# Patient Record
Sex: MALE
[De-identification: names, ages, dates, MRNs, and addresses within clinical notes are randomized per-mention and may not be internally consistent; named-entity substitution may affect disease eponyms.]

---

## 2018-07-03 ENCOUNTER — HOSPITAL ENCOUNTER (EMERGENCY)
Dept: HOSPITAL 31 - C.ER | Age: 23
Discharge: HOME | End: 2018-07-03
Payer: COMMERCIAL

## 2018-07-03 VITALS
RESPIRATION RATE: 16 BRPM | TEMPERATURE: 97.6 F | OXYGEN SATURATION: 98 % | DIASTOLIC BLOOD PRESSURE: 72 MMHG | HEART RATE: 90 BPM | SYSTOLIC BLOOD PRESSURE: 119 MMHG

## 2018-07-03 DIAGNOSIS — H61.23: Primary | ICD-10-CM

## 2018-07-03 NOTE — C.PDOC
History Of Present Illness


22 year old male presents to the ED for removal of ear wax from his bilateral 

ears. Patient was seen by his PMD after feeling clogged sensation in his ears 

and told that he has ear wax in his bilateral ears. Patient cannot afford to 

see a doctor for wax removal and presents to the ED for further evaluation. He 

denies fever, chills, or ear pain. 


Time Seen by Provider: 07/03/18 13:06


Chief Complaint (Nursing): ENT Problem


History Per: Patient


History/Exam Limitations: None


Onset/Duration Of Symptoms: Days


Current Symptoms Are (Timing): Still Present


Quality (Ear): denies: Pain W/Touch





Past Medical History


Reviewed: Historical Data, Nursing Documentation, Vital Signs


Vital Signs: 


 Last Vital Signs











Temp  97.6 F   07/03/18 12:59


 


Pulse  90   07/03/18 12:59


 


Resp  16   07/03/18 12:59


 


BP  119/72   07/03/18 12:59


 


Pulse Ox  98   07/03/18 16:42














- Medical History


PMH: No Chronic Diseases


Surgical History: No Surg Hx


Family History: States: Unknown Family Hx





- Social History


Hx Alcohol Use: No


Hx Substance Use: No





- Immunization History


Hx Tetanus Toxoid Vaccination: Yes


Hx Influenza Vaccination: No


Hx Pneumococcal Vaccination: No





Review Of Systems


Constitutional: Negative for: Fever, Chills


ENT: Positive for: Other (cerumen in bilateral ears ).  Negative for: Ear Pain





Physical Exam





- Physical Exam


Appears: Non-toxic, No Acute Distress


Skin: Normal Color, Warm, Dry


Head: Atraumatic, Normacephalic


Eye(s): bilateral: Normal Inspection


Ear(s): Bilateral: TM Obscured By Wax (dark and hard cerumen visualized )


Oral Mucosa: Moist


Extremity: Normal ROM


Neurological/Psych: Oriented x3, Normal Speech, Normal Cognition





ED Course And Treatment


O2 Sat by Pulse Oximetry: 98 (on RA)


Pulse Ox Interpretation: Normal


Progress Note: Patient is resting comfortably, showing no signs of distress and 

is stable for discharge. Patient is advised to apply Debrox ear drops to both 

of his ears for three days and then return to the ED for cerumen removal.





Disposition





- Disposition


Referrals: 


Behin,Babak, MD [Staff Provider] - 


Disposition: HOME/ ROUTINE


Disposition Time: 13:09


Condition: STABLE


Additional Instructions: 


Follow up with PMD / ENT within 1-2 days. Return to ED if feel worse.


Prescriptions: 


Carbamide Peroxide [Debrox Ear Drops] 4 drop AU TID #1 bottle


Instructions:  Ear Wax Impaction (DC)


Forms:  [employer] (English)


Print Language: Thai





- Clinical Impression


Clinical Impression: 


 Impacted cerumen of both ears








- PA / NP / Resident Statement


MD/DO has reviewed & agrees with the documentation as recorded.





- Scribe Statement


The provider has reviewed the documentation as recorded by the Scribe (Elaine Long)








All medical record entries made by the Scribe were at my direction and 

personally dictated by me. I have reviewed the chart and agree that the record 

accurately reflects my personal performance of the history, physical exam, 

medical decision making, and the department course for this patient. I have 

also personally directed, reviewed, and agree with the discharge instructions 

and disposition.